# Patient Record
(demographics unavailable — no encounter records)

---

## 2021-03-23 NOTE — CAT SCAN REPORT
CT abdomen pelvis wo con



INDICATION:

OTHER MICROSCOPIC HEMATURIA.



TECHNIQUE:

All CT scans at this location are performed using CT dose reduction for ALARA by means of automated e
xposure control. 



COMPARISON:

None available.



FINDINGS:

Lung bases are clear of acute disease. Liver, spleen and pancreas appear negative on this noncontrast
 exam. Increased attenuation in the fundus of the gallbladder suggests posteriorly layering stones or
 biliary sludge.



Kidneys and adrenals appear negative. No calculi, hydronephrosis or abnormal mass.



Abdominal aorta is normal. No adenopathy.



Pelvis



Normal appendix. Urinary bladder is mostly collapsed but grossly negative. Uterus is unremarkable. Bi
lateral tubal ligation.



No significant skeletal lesions.



IMPRESSION:

1. Questionable small gallstones or biliary sludge. Ultrasound of the gallbladder may be helpful for 
further evaluation.

2. Urinary tract appears unremarkable on this noncontrast study. I do not see the cause of the patien
t's hematuria. 



Signer Name: Jayme Mendez MD 

Signed: 3/23/2021 3:07 PM

Workstation Name: [id number]